# Patient Record
(demographics unavailable — no encounter records)

---

## 2024-10-22 NOTE — COUNSELING
[Yes] : Risk of tobacco use and health benefits of smoking cessation discussed: Yes [Cessation strategies including cessation program discussed] : Cessation strategies including cessation program discussed [Use of nicotine replacement therapies and other medications discussed] : Use of nicotine replacement therapies and other medications discussed [Smoking Cessation Program Referral] : Smoking Cessation Program Referral  [FreeTextEntry3] : 11

## 2024-10-22 NOTE — ASSESSMENT
[FreeTextEntry1] : Mr. HO GARCIA is a 65 year old man long-term current smoker(30 pack years), former  and 9/11  with HTN, CVA, h/o DVT, hyperlipidemia is here for evaluation.   #COPD -pulmonary function testing today with evidence of moderate obstructive defect, postbronchodilator FEV1 60%; evidence of air trapping and hyperinflation, DLCO is normal, there is positive bronchodilator response --c/w Anoro -- Albuterol as needed  #Abnormal CT chest: CT chest (R) 6/4/24:  New peripheral opacities including a 3.4 x 1.9 cm focus in the anterior medial right middle lobe on series 2/image 221, a 3.0 cm focus in the anterior lateral left lower lobe on image 283, and a 5.4 x 3.3 cm focus in the posterior lateral left lung base on image 292.  Upper lobe predominant emphysema -- CT chest Sept - resolution of previous opacities -- repeat CT chest Sept 2024  #Current smoker -greater than 30-pack-year history.  Benefits of smoking cessation discussed, smoking cessation strongly encouraged Continues smoking. Tried Chantix which worked well but restared due to stress.  -- Advised to discuss initiation of Wellbutrin with his psych NP -- Need to clarify vaccination status  All questions answered. Patient in agreement with plan.  Follow up in 3mo  or sooner if needed.

## 2024-10-22 NOTE — CONSULT LETTER
[Dear  ___] : Dear  [unfilled], [Consult Letter:] : I had the pleasure of evaluating your patient, [unfilled]. [Please see my note below.] : Please see my note below. [Consult Closing:] : Thank you very much for allowing me to participate in the care of this patient.  If you have any questions, please do not hesitate to contact me. [FreeTextEntry3] : Sincerely,  Jaylin Bee MD [facility] Physician [facility] tel: [phone] fax: [phone] set alarm for 420

## 2024-10-22 NOTE — PHYSICAL EXAM
[No Acute Distress] : no acute distress [Normal Oropharynx] : normal oropharynx [Normal Appearance] : normal appearance [No Neck Mass] : no neck mass [Normal Rate/Rhythm] : normal rate/rhythm [Normal S1, S2] : normal s1, s2 [No Murmurs] : no murmurs [No Resp Distress] : no resp distress [No Abnormalities] : no abnormalities [Benign] : benign [Normal Gait] : normal gait [No Clubbing] : no clubbing [No Cyanosis] : no cyanosis [No Edema] : no edema [FROM] : FROM [Normal Color/ Pigmentation] : normal color/ pigmentation [No Focal Deficits] : no focal deficits [Oriented x3] : oriented x3 [Normal Affect] : normal affect [TextBox_68] : few rhonchi

## 2024-10-22 NOTE — HISTORY OF PRESENT ILLNESS
[Former] : former [>= 20 pack years] : >= 20 pack years [Never] : never [TextBox_4] : Mr. HO GARCIA is a 65 year old man long-term current smoker(30 pack years)  with HTN, CVA, h/o DVT, hyperlipidemia is here for evaluation.   History: Reports respiratory issues since childhood. Was unable to run long distance in track. Never been dx with asthma/COPD. Not on any inhalers. Treated with Medrol June 2024, denies prior steroid use.  Was treated for PNA (at )  5/2024 - treated with Cefdinir + Doxy  Interval Events:  Doing better since starting Anoro.  Continues smoking. Smoking - tried Chantix which worked well but restarted due to stress.   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 Has been smoking since age 35. Current smoker since age 35, 1ppd.  former  and 9/11  FH: Denies family hx of pulmonary or autoimmune disease PMD: LUZ RICHARDS  [TextBox_11] : 1 [TextBox_13] : 30

## 2025-02-25 NOTE — HISTORY OF PRESENT ILLNESS
[de-identified] : Mr. HO GARCIA 66 year male with a PMH HTN, has a loop recorder, h/o CVA, , h/o DVT, hyperlipidemia, depression, vitamin d deficiency, essential tremor, microhematuria, BPH,  present to the officer for a physical exam.  Patient feels good, denies complaints at present time.   Quit smoking on 12/2023, 3 month after resume.   Did a ct scan of the chest on 09/11/24 - RADS 1, recommend  to repeat in 1 year  Had a colonoscopy and EGD on 02/20/24. Had a polypectomy(tubular adenoma) Had an EGD 02/20/24 - Chronic inactive gastritis, negative for intestinal metaplasia

## 2025-02-25 NOTE — REVIEW OF SYSTEMS
[TextEntry] : Constitutional: Denies fever, snoring,  recent changes in the weight, c/o tiredness and fatique Head: Denies headache, dizziness Eyes: Denies diplopia, tearing or pain Ears: Denies earache, tinnitus, has some hearing loss Nose: Denies nasal obstruction,  has some runny nose Throat: Denies throat pain Neck: Denies stiffness, muscle tenderness Chest: Denies cough, SOB, wheezing, chest congestion CV: Denies chest pain, palpitation GI: Denies abdominal pain, constipation, heartburn Genitourinary: Denies dysuria, urinary urgency, c/o nocturia Musculoskeletal: Denies joint pain Neuro: Denies changes in mental status Psychiatric: Denies depressive symptoms, change in sleep habits, changes in thought content

## 2025-02-25 NOTE — HEALTH RISK ASSESSMENT
[Good] : ~his/her~  mood as  good [2 - 3 times a week (3 pts)] : 2 - 3  times a week (3 points) [1 or 2 (0 pts)] : 1 or 2 (0 points) [No] : In the past 12 months have you used drugs other than those required for medical reasons? No [No falls in past year] : Patient reported no falls in the past year [Little interest or pleasure doing things] : 1) Little interest or pleasure doing things [0] : 1) Little interest or pleasure doing things: Not at all (0) [Feeling down, depressed, or hopeless] : 2) Feeling down, depressed, or hopeless [3] : 2) Feeling down, depressed, or hopeless for nearly every day (3) [PHQ-2 Positive] : PHQ-2 Positive [Nearly Every Day (3)] : 4.) Feeling tired or having little energy? Nearly every day [Not at All (0)] : 8.) Moving or speaking so slowly that other people could have noticed, or the opposite, moving or speaking faster than usual? Not at all [Mild] : Severity of Depression is Mild [Somewhat Difficult] : How difficult have these problems made it for you to do your work, take care of things at home, or get along with people? Somewhat difficult [PHQ-9 Positive] : PHQ-9 Positive [Current] : Current [1] : 1 [None] : None [# of Members in Household ___] :  household currently consist of [unfilled] member(s) [Retired] : retired [High School] : high school [] :  [# Of Children ___] : has [unfilled] children [Feels Safe at Home] : Feels safe at home [Fully functional (bathing, dressing, toileting, transferring, walking, feeding)] : Fully functional (bathing, dressing, toileting, transferring, walking, feeding) [Fully functional (using the telephone, shopping, preparing meals, housekeeping, doing laundry, using] : Fully functional and needs no help or supervision to perform IADLs (using the telephone, shopping, preparing meals, housekeeping, doing laundry, using transportation, managing medications and managing finances) [Reports changes in hearing] : Reports changes in hearing [Reports changes in vision] : Reports changes in vision [Reports changes in dental health] : Reports changes in dental health [Smoke Detector] : smoke detector [Carbon Monoxide Detector] : carbon monoxide detector [Safety elements used in home] : safety elements used in home [Seat Belt] :  uses seat belt [Patient/Caregiver not ready to engage] : , patient/caregiver not ready to engage [Very Good] : ~his/her~ current health as very good [Never (0 pts)] : Never (0 points) [Yes] : Reviewed medication list for presence of high-risk medications. [Benzodiazepines] : benzodiazepines [NO] : No [No Retinopathy] : No retinopathy [HIV test declined] : HIV test declined [Hepatitis C test declined] : Hepatitis C test declined [Sunscreen] : uses sunscreen [DNR] : DNR [DNI] : DNI [de-identified] : no [de-identified] : Psychiatris(CABRERA Sanz),, neurologist( Dr. Bernstein), cardiologist(Dr. Freeman), Urologist(Dr. Nesbitt) [Audit-CScore] : 3 [de-identified] : bike riding [de-identified] : regular [EWV3Glvzu] : 3 [QIM0YipjuOnypt] : 6 [de-identified] : 1 pk per day since age 35, Quit fro 3 mo [LowDoseCTScan] : 09/2024 [EyeExamDate] : 2022 [Change in mental status noted] : No change in mental status noted [Language] : denies difficulty with language [Behavior] : denies difficulty with behavior [Learning/Retaining New Information] : denies difficulty learning/retaining new information [Handling Complex Tasks] : denies difficulty handling complex tasks [Sexually Active] : not sexually active [ColonoscopyDate] : 02/20/2024 [ColonoscopyComments] : IH,  polypectomy(tubular adenoma), f/u in 3 years [de-identified] : with wife [de-identified] : has  decrease hearing b/l(was recommended to wear hearing aid) [de-identified] : wear corrective glasses [de-identified] : needs to have an implant

## 2025-02-25 NOTE — PHYSICAL EXAM
[TextEntry] : Constitutional: Well nourished, well appearing, not in acute distress Head: Normocephalic, no lesions Eyes: PERRLA, conjunctiva is NL Ear: Ear canal is normal, tympanic membrane is intact Nose: Nasal turbinates are NL Throat: Clear, no exudates, no lesions Neck: Supple, no masses Chest: Lungs are clear, no rales, no rhonchi, no wheezing. Has a loop recorder under the skin Heart: Regular rhythm,  no murmurs, no rubs, no gallops Abdomen: Soft, no tenderness : No CVAT Extremeties: FROM, no deformities, no edema Muskuloskeletal: has no tenderness of the spine, ROM is NL Skin: Has some age related skin changes . Has some skin lesion on the upper extremities and frontal lobe. Has a tattoo on the L upper back. Has a dry skin on the lower extremities Neuro: AAO x 3, no focal neurological deficit. Psychiatric: oriented to person, place, and time and insight and judgment were intact.

## 2025-02-25 NOTE — PLAN
Treatment Goal Explanation (Does Not Render In The Note): Stable for the purposes of categorizing medical decision making is defined by the specific treatment goals for an individual patient. A patient that is not at their treatment goal is not stable, even if the condition has not changed and there is no short- term threat to life or function.
Treatment Goal Met?: no
[FreeTextEntry1] : Mr. HO GARCIA 66 year male with a PMH HTN, has a loop recorder, h/o CVA, BPH h/o DVT, hyperlipidemia, depression, vitamin d deficiency, essential tremor, microhematuria present to the officer for a physical exam Well adult exam is performed. Recommend  to do a blood test today, further management will depend on the blood test results.  EKG was done and reviewed: NSR 62 bpm, LVH, no acute st-t changes, f/u with cardiologist HTN is well controlled continue norvasc 5 mg qd H/o CVA continue plavix Essential tremor continue propranolol AX908dg bid, f/u with neurologist Depression continue  clonazepam 0.5mg, seroquil 100mg qhs, effexor 150mg and 37.5mg, f/u with psychiatrist GERD continue pepcid Hyperlipidemia - lipitor 40mg qhd H/o CVA, DVT continue plavix BPH continue flomax 0.4mg, f/u with urologist Dry skin  recommend  to apply lachydrin lotion Do ct scan of the chest on 09/2025. Smoking cessation is advised F/u with GI(screening colonoscopy) Decrease hearing, do not want to see an ENT, as it was told that need hearing aid.  RTC to f/u in 2 wks. Patient is verbalized understanding

## 2025-02-25 NOTE — COUNSELING
[Fall prevention counseling provided] : Fall prevention counseling provided [Adequate lighting] : Adequate lighting [No throw rugs] : No throw rugs [Use proper foot wear] : Use proper foot wear [AUDIT-C Screening administered and reviewed] : AUDIT-C Screening administered and reviewed [Hazards of at-risk alcohol use discussed] : Hazards of at-risk alcohol use discussed [Strategies to reduce or eliminate alcohol use discussed] : Strategies to reduce or eliminate alcohol use discussed [Quit Drinking] : Quit Drinking [Use of nicotine replacement therapies and other medications discussed] : Use of nicotine replacement therapies and other medications discussed [Encouraged to pick a quit date and identify support needed to quit] : Encouraged to pick a quit date and identify support needed to quit [Yes] : Willing to quit smoking

## 2025-07-29 NOTE — LETTER BODY
[Dear  ___] : Dear  [unfilled], [Consult Letter:] : I had the pleasure of evaluating your patient, [unfilled]. [Please see my note below.] : Please see my note below. [Consult Closing:] : Thank you very much for allowing me to participate in the care of this patient.  If you have any questions, please do not hesitate to contact me. [Sincerely,] : Sincerely, [FreeTextEntry3] : Lenard Nesbitt MD

## 2025-07-29 NOTE — END OF VISIT
[Time Spent: ___ minutes] : I have spent [unfilled] minutes of time on the encounter which excludes teaching and separately reported services. [FreeTextEntry4] :  This note was written by Mercedez Neil on 07/29/2025 actively solely Lenard Zepeda M.D. I, Mercedez Neil, am scribing for and in the presence of Lenard Zepeda M.D. in the following sections HISTORY OF PRESENT ILLNESS, PAST MEDICAL/FAMILY/SOCIAL HISTORY; REVIEW OF SYSTEMS; VITAL SIGNS; PHYSICAL EXAM; ASSESSMENT/PLAN.     All medical record entries made by this scribe at my, Lenard Zepeda M.D. direction and personally dictated by me on 07/29/2025. I personally performed the services described in the documentation, reviewed the documentation recorded by the scribe in my presence, and it accurately and completely records my words and actions.

## 2025-07-29 NOTE — HISTORY OF PRESENT ILLNESS
[FreeTextEntry1] : 08/01/2023: 64 yrs old male, evaluated for microhematuria with CT and cysto. No Ca or stones diagnosed. On Tamsulosin for LUTS. Doing well. PVR 26 ml. Occasional urgency. NO UA today as pt voided.  Will continue Tamsulosin  RTC: 1 year for Follow up: PSA, UA, culture, uroflow and bladder scan.      07/30/2024: HO GARCIA is 65 years Male presents today for a follow up for LUTS. Has symptomatic BPH, on tamsulosin, and doing good with it, will continue taking tamsulosin. PVR today 7 cc.   Nocturia 1-2x.   07/31/2023 PSA was 1.21 ng/mL 10/31/2023 PSA was 1.32 ng/mL PSA drawn today.  RTC: 1 year, Follow up: PSA, UA, culture, uroflow and bladder scan     07/29/2025, 65 y/o male presents with a follow up visit for BPH w LUTS   On Tamsulosin 0.4 MG. Patient is doing well, he is very happy with his symptoms on medication. Occasional Nocturia x1.   Uroflow: Not representative PVR: 0 cc  PSA:  06/29/2021: 1.37 ng/mL  07/08/2022: 1.44 ng/mL  07/31/2023: 1.21 ng/mL 10/31/2023: 1.32 ng/mL 07/30/2024: 2.38 ng/mL  Rising, though within normal limits  PSA drawn today.  As patient is doing well on medication. Will continue Tamsulosin.  Hx Stroke: being followed by Dr. Freeman   RTC 1 year follow up for visit PSA, Uroflow, PVR, and UA and Culture

## 2025-07-29 NOTE — PHYSICAL EXAM
[Normal Appearance] : normal appearance [Well Groomed] : well groomed [General Appearance - In No Acute Distress] : no acute distress [Edema] : no peripheral edema [Respiration, Rhythm And Depth] : normal respiratory rhythm and effort [Exaggerated Use Of Accessory Muscles For Inspiration] : no accessory muscle use [Abdomen Soft] : soft [Abdomen Tenderness] : non-tender [Costovertebral Angle Tenderness] : no ~M costovertebral angle tenderness [Urinary Bladder Findings] : the bladder was normal on palpation [Normal Station and Gait] : the gait and station were normal for the patient's age [] : no rash [No Focal Deficits] : no focal deficits [Oriented To Time, Place, And Person] : oriented to person, place, and time [Affect] : the affect was normal [Mood] : the mood was normal [No Palpable Adenopathy] : no palpable adenopathy [Chaperone Present] : A chaperone was present in the examining room during all aspects of the physical examination [FreeTextEntry2] :  Mercedez Neil